# Patient Record
Sex: FEMALE | Race: AMERICAN INDIAN OR ALASKA NATIVE
[De-identification: names, ages, dates, MRNs, and addresses within clinical notes are randomized per-mention and may not be internally consistent; named-entity substitution may affect disease eponyms.]

---

## 2018-09-17 ENCOUNTER — HOSPITAL ENCOUNTER (EMERGENCY)
Dept: HOSPITAL 5 - ED | Age: 28
Discharge: HOME | End: 2018-09-17
Payer: SELF-PAY

## 2018-09-17 VITALS — SYSTOLIC BLOOD PRESSURE: 131 MMHG | DIASTOLIC BLOOD PRESSURE: 83 MMHG

## 2018-09-17 DIAGNOSIS — K02.9: Primary | ICD-10-CM

## 2018-09-17 PROCEDURE — 99281 EMR DPT VST MAYX REQ PHY/QHP: CPT

## 2018-09-17 NOTE — EMERGENCY DEPARTMENT REPORT
ED ENT HPI





- General


Chief complaint: Dental/Oral


Stated complaint: JAW/WISDOM TOOTH


Time Seen by Provider: 09/17/18 19:11


Source: patient


Mode of arrival: Ambulatory


Limitations: No Limitations





- Related Data


 Previous Rx's











 Medication  Instructions  Recorded  Last Taken  Type


 


Acetaminophen/Codeine [Tylenol 1 tab PO Q6H PRN #24 tab 09/17/18 Unknown Rx





/Codeine # 3 tab]    


 


Ibuprofen [Motrin] 800 mg PO Q8HR PRN #30 tablet 09/17/18 Unknown Rx


 


Penicillin V Potassium 500 mg PO TID #30 tablet 09/17/18 Unknown Rx











 Allergies











Allergy/AdvReac Type Severity Reaction Status Date / Time


 


No Known Allergies Allergy   Unverified 09/17/18 16:54














ED Dental HPI





- General


Chief complaint: Dental/Oral


Stated complaint: JAW/WISDOM TOOTH


Time Seen by Provider: 09/17/18 19:11


Source: patient


Mode of arrival: Ambulatory


Limitations: No Limitations





- History of Present Illness


Initial comments: 





Patient complains of right upper lower tooth pain and jaw swelling for the last 

2 days.  Patient states lashes time she had a tooth abscess and was supposed to 

have the tooth pulled at the taken antibiotics but did not return to the [facility] 

at the have a relief of her symptoms with antibiotics.


MD complaint: tooth pain


-: Gradual


Severity: moderate


Quality: dull


Consistency: constant


Improves with: none


Worsens with: chewing, movement, hot/cold liquids


Context- Dental: history of dental caries


Dental Associated Symptons: No: Headache, Earache, Sore Throat, Gum Swelling, 

Fever





- Related Data


 Previous Rx's











 Medication  Instructions  Recorded  Last Taken  Type


 


Acetaminophen/Codeine [Tylenol 1 tab PO Q6H PRN #24 tab 09/17/18 Unknown Rx





/Codeine # 3 tab]    


 


Ibuprofen [Motrin] 800 mg PO Q8HR PRN #30 tablet 09/17/18 Unknown Rx


 


Penicillin V Potassium 500 mg PO TID #30 tablet 09/17/18 Unknown Rx











 Allergies











Allergy/AdvReac Type Severity Reaction Status Date / Time


 


No Known Allergies Allergy   Unverified 09/17/18 16:54














ED Review of Systems


ROS: 


Stated complaint: JAW/WISDOM TOOTH


Other details as noted in HPI





Constitutional: denies: chills, fever


Eyes: denies: eye pain, eye discharge, vision change


ENT: dental pain.  denies: ear pain, throat pain


Respiratory: denies: cough, shortness of breath, wheezing


Cardiovascular: denies: chest pain, palpitations


Endocrine: no symptoms reported


Gastrointestinal: denies: abdominal pain, nausea, diarrhea


Genitourinary: denies: urgency, dysuria, discharge


Musculoskeletal: denies: back pain, joint swelling, arthralgia


Skin: denies: rash, lesions


Neurological: denies: headache, weakness, paresthesias


Psychiatric: denies: anxiety, depression


Hematological/Lymphatic: denies: easy bleeding, easy bruising





ED Past Medical Hx





- Past Medical History


Previous Medical History?: No





- Surgical History


Past Surgical History?: No





- Social History


Smoking Status: Never Smoker


Substance Use Type: None





- Medications


Home Medications: 


 Home Medications











 Medication  Instructions  Recorded  Confirmed  Last Taken  Type


 


Acetaminophen/Codeine [Tylenol 1 tab PO Q6H PRN #24 tab 09/17/18  Unknown Rx





/Codeine # 3 tab]     


 


Ibuprofen [Motrin] 800 mg PO Q8HR PRN #30 tablet 09/17/18  Unknown Rx


 


Penicillin V Potassium 500 mg PO TID #30 tablet 09/17/18  Unknown Rx














ED Physical Exam





- General


Limitations: No Limitations


General appearance: alert, in no apparent distress





- Head


Head exam: Present: atraumatic, normocephalic





- Eye


Eye exam: Present: normal appearance





- ENT


ENT exam: Present: mucous membranes moist, other (patient has intraoral mucosal 

swelling with dental caries on exam of the right upper and lower molars)





- Neck


Neck exam: Present: normal inspection





- Respiratory


Respiratory exam: Present: normal lung sounds bilaterally.  Absent: respiratory 

distress





- Cardiovascular


Cardiovascular Exam: Present: regular rate, normal rhythm.  Absent: systolic 

murmur, diastolic murmur, rubs, gallop





- GI/Abdominal


GI/Abdominal exam: Present: soft, normal bowel sounds





- Extremities Exam


Extremities exam: Present: normal inspection





- Back Exam


Back exam: Present: normal inspection





- Neurological Exam


Neurological exam: Present: alert, oriented X3





- Psychiatric


Psychiatric exam: Present: normal affect, normal mood





- Skin


Skin exam: Present: warm, dry, intact, normal color.  Absent: rash





ED Course





 Vital Signs











  09/17/18





  16:52


 


Temperature 98.9 F


 


Pulse Rate 77


 


Respiratory 16





Rate 


 


Blood Pressure 131/83


 


O2 Sat by Pulse 100





Oximetry 














ED Medical Decision Making





- Medical Decision Making





Discussed plan of care with patient


Critical care attestation.: 


If time is entered above; I have spent that time in minutes in the direct care 

of this critically ill patient, excluding procedure time.








ED Disposition


Clinical Impression: 


 Odontalgia, Dental caries





Disposition: DC-01 TO HOME OR SELFCARE


Is pt being admited?: No


Does the pt Need Aspirin: No


Condition: Stable


Instructions:  Dental Caries (ED), Toothache (ED)


Additional Instructions: 


return if worse


Prescriptions: 


Acetaminophen/Codeine [Tylenol /Codeine # 3 tab] 1 tab PO Q6H PRN #24 tab


 PRN Reason: pain


Ibuprofen [Motrin] 800 mg PO Q8HR PRN #30 tablet


 PRN Reason: pain


Penicillin V Potassium 500 mg PO TID #30 tablet


Referrals: 


[facility] [Outside] - 3-5 Days


Time of Disposition: 19:25